# Patient Record
Sex: MALE | URBAN - METROPOLITAN AREA
[De-identification: names, ages, dates, MRNs, and addresses within clinical notes are randomized per-mention and may not be internally consistent; named-entity substitution may affect disease eponyms.]

---

## 2022-07-28 ENCOUNTER — TELEPHONE (OUTPATIENT)
Dept: ORTHOPEDIC SURGERY | Age: 22
End: 2022-07-28

## 2022-07-29 NOTE — TELEPHONE ENCOUNTER
Pati Blanchard  called  this  morning  and  made  an  appt. To  discuss treatment options.   You do not  need to  return  his  call from  the  previous  message